# Patient Record
Sex: MALE | Race: WHITE | NOT HISPANIC OR LATINO | URBAN - METROPOLITAN AREA
[De-identification: names, ages, dates, MRNs, and addresses within clinical notes are randomized per-mention and may not be internally consistent; named-entity substitution may affect disease eponyms.]

---

## 2022-06-24 PROBLEM — 428283002 HISTORY OF POLYP OF COLON: Status: ACTIVE | Noted: 2022-06-24

## 2022-07-19 ENCOUNTER — OFFICE VISIT (OUTPATIENT)
Dept: URBAN - METROPOLITAN AREA SURGERY CENTER 19 | Facility: SURGERY CENTER | Age: 62
End: 2022-07-19
Payer: COMMERCIAL

## 2022-07-19 ENCOUNTER — CLAIMS CREATED FROM THE CLAIM WINDOW (OUTPATIENT)
Dept: URBAN - METROPOLITAN AREA CLINIC 4 | Facility: CLINIC | Age: 62
End: 2022-07-19
Payer: COMMERCIAL

## 2022-07-19 ENCOUNTER — TELEPHONE ENCOUNTER (OUTPATIENT)
Dept: URBAN - METROPOLITAN AREA CLINIC 92 | Facility: CLINIC | Age: 62
End: 2022-07-19

## 2022-07-19 DIAGNOSIS — D12.3 BENIGN NEOPLASM OF TRANSVERSE COLON: ICD-10-CM

## 2022-07-19 DIAGNOSIS — Z86.010 ADENOMAS PERSONAL HISTORY OF COLONIC POLYPS: ICD-10-CM

## 2022-07-19 DIAGNOSIS — K63.89 OTHER SPECIFIED DISEASES OF INTESTINE: ICD-10-CM

## 2022-07-19 DIAGNOSIS — D12.2 ADENOMA OF ASCENDING COLON: ICD-10-CM

## 2022-07-19 PROCEDURE — G8907 PT DOC NO EVENTS ON DISCHARG: HCPCS | Performed by: INTERNAL MEDICINE

## 2022-07-19 PROCEDURE — 88305 TISSUE EXAM BY PATHOLOGIST: CPT | Performed by: PATHOLOGY

## 2022-07-19 PROCEDURE — 45385 COLONOSCOPY W/LESION REMOVAL: CPT | Performed by: INTERNAL MEDICINE

## 2022-07-19 RX ORDER — DICYCLOMINE HYDROCHLORIDE 10 MG/1
1 TABLET CAPSULE ORAL
Qty: 20 | Refills: 1 | OUTPATIENT
Start: 2022-07-19 | End: 2022-08-16

## 2024-09-04 ENCOUNTER — DASHBOARD ENCOUNTERS (OUTPATIENT)
Age: 64
End: 2024-09-04

## 2024-09-04 ENCOUNTER — OFFICE VISIT (OUTPATIENT)
Dept: URBAN - METROPOLITAN AREA CLINIC 80 | Facility: CLINIC | Age: 64
End: 2024-09-04
Payer: COMMERCIAL

## 2024-09-04 VITALS
WEIGHT: 267.2 LBS | HEIGHT: 69 IN | HEART RATE: 58 BPM | DIASTOLIC BLOOD PRESSURE: 94 MMHG | BODY MASS INDEX: 39.58 KG/M2 | TEMPERATURE: 97.2 F | SYSTOLIC BLOOD PRESSURE: 158 MMHG

## 2024-09-04 DIAGNOSIS — K21.9 GASTROESOPHAGEAL REFLUX DISEASE, UNSPECIFIED WHETHER ESOPHAGITIS PRESENT: ICD-10-CM

## 2024-09-04 DIAGNOSIS — Z86.010 PERSONAL HISTORY OF COLONIC POLYPS: ICD-10-CM

## 2024-09-04 PROBLEM — 428283002: Status: ACTIVE | Noted: 2024-09-04

## 2024-09-04 PROBLEM — 235595009: Status: ACTIVE | Noted: 2024-09-04

## 2024-09-04 PROCEDURE — 99214 OFFICE O/P EST MOD 30 MIN: CPT | Performed by: INTERNAL MEDICINE

## 2024-09-04 RX ORDER — FAMOTIDINE 40 MG/1
1 TABLET AT BEDTIME TABLET, FILM COATED ORAL ONCE A DAY
Status: ACTIVE | COMMUNITY

## 2024-09-04 RX ORDER — OLMESARTAN MEDOXOMIL AND HYDROCHLOROTHIAZIDE 40; 12.5 MG/1; MG/1
1 TABLET TABLET, FILM COATED ORAL ONCE A DAY
Status: ACTIVE | COMMUNITY

## 2024-09-04 RX ORDER — FAMOTIDINE 40 MG/1
1 TABLET TABLET, FILM COATED ORAL TWICE A DAY
Qty: 180 TABLET | Refills: 1 | OUTPATIENT
Start: 2024-09-04

## 2024-09-04 RX ORDER — APIXABAN 5 MG/1
AS DIRECTED TABLET, FILM COATED ORAL
Status: ACTIVE | COMMUNITY

## 2024-09-04 RX ORDER — ATORVASTATIN CALCIUM 40 MG/1
1 TABLET TABLET, FILM COATED ORAL ONCE A DAY
Status: ACTIVE | COMMUNITY

## 2024-09-04 RX ORDER — ANASTROZOLE 1 MG/1
1 TABLET TABLET, FILM COATED ORAL ONCE A DAY
Status: ACTIVE | COMMUNITY

## 2024-09-04 RX ORDER — NEBIVOLOL 10 MG/1
1 TABLET TABLET ORAL ONCE A DAY
Status: ACTIVE | COMMUNITY

## 2024-09-04 NOTE — HPI-TODAY'S VISIT:
He comes in today accompanied by his wife to discuss colonoscopy.  He has no lower GI complaints.  He was changed to famotidine which tends to control his GERD although he notes breakthrough symptoms in the evenings. He is now on eliquis for atrial fibrillation.

## 2024-11-13 ENCOUNTER — OFFICE VISIT (OUTPATIENT)
Dept: URBAN - METROPOLITAN AREA SURGERY CENTER 19 | Facility: SURGERY CENTER | Age: 64
End: 2024-11-13
Payer: COMMERCIAL

## 2024-11-13 ENCOUNTER — CLAIMS CREATED FROM THE CLAIM WINDOW (OUTPATIENT)
Dept: URBAN - METROPOLITAN AREA CLINIC 4 | Facility: CLINIC | Age: 64
End: 2024-11-13
Payer: COMMERCIAL

## 2024-11-13 DIAGNOSIS — Z09 ENCNTR FOR F/U EXAM AFT TRTMT FOR COND OTH THAN MALIG NEOPLM: ICD-10-CM

## 2024-11-13 DIAGNOSIS — Z86.0100 PERSONAL HISTORY OF COLONIC POLYPS: ICD-10-CM

## 2024-11-13 DIAGNOSIS — D12.8 BENIGN NEOPLASM OF RECTUM: ICD-10-CM

## 2024-11-13 DIAGNOSIS — D12.7 ADENOMA OF RECTOSIGMOID JUNCTION OF LARGE INTESTINE: ICD-10-CM

## 2024-11-13 DIAGNOSIS — Z09 ENCOUNTER FOR FOLLOW-UP EXAMINATION AFTER COMPLETED TREATMENT FOR CONDITIONS OTHER THAN MALIGNANT NEOPLASM: ICD-10-CM

## 2024-11-13 DIAGNOSIS — D12.7 BENIGN NEOPLASM OF RECTOSIGMOID JUNCTION: ICD-10-CM

## 2024-11-13 PROCEDURE — 00812 ANES LWR INTST SCR COLSC: CPT | Performed by: NURSE ANESTHETIST, CERTIFIED REGISTERED

## 2024-11-13 PROCEDURE — 88305 TISSUE EXAM BY PATHOLOGIST: CPT | Performed by: PATHOLOGY

## 2024-11-13 PROCEDURE — 45385 COLONOSCOPY W/LESION REMOVAL: CPT | Performed by: INTERNAL MEDICINE

## 2024-11-13 PROCEDURE — G9998 DOC MED RSN <3 COLON: HCPCS | Performed by: INTERNAL MEDICINE

## 2024-11-13 RX ORDER — FAMOTIDINE 40 MG/1
1 TABLET AT BEDTIME TABLET, FILM COATED ORAL ONCE A DAY
Status: ACTIVE | COMMUNITY

## 2024-11-13 RX ORDER — ANASTROZOLE 1 MG/1
1 TABLET TABLET, FILM COATED ORAL ONCE A DAY
Status: ACTIVE | COMMUNITY

## 2024-11-13 RX ORDER — OLMESARTAN MEDOXOMIL AND HYDROCHLOROTHIAZIDE 40; 12.5 MG/1; MG/1
1 TABLET TABLET, FILM COATED ORAL ONCE A DAY
Status: ACTIVE | COMMUNITY

## 2024-11-13 RX ORDER — APIXABAN 5 MG/1
AS DIRECTED TABLET, FILM COATED ORAL
Status: ACTIVE | COMMUNITY

## 2024-11-13 RX ORDER — ATORVASTATIN CALCIUM 40 MG/1
1 TABLET TABLET, FILM COATED ORAL ONCE A DAY
Status: ACTIVE | COMMUNITY

## 2024-11-13 RX ORDER — NEBIVOLOL 10 MG/1
1 TABLET TABLET ORAL ONCE A DAY
Status: ACTIVE | COMMUNITY

## 2024-11-13 RX ORDER — FAMOTIDINE 40 MG/1
1 TABLET TABLET, FILM COATED ORAL TWICE A DAY
Qty: 180 TABLET | Refills: 1 | Status: ACTIVE | COMMUNITY
Start: 2024-09-04